# Patient Record
Sex: FEMALE | URBAN - METROPOLITAN AREA
[De-identification: names, ages, dates, MRNs, and addresses within clinical notes are randomized per-mention and may not be internally consistent; named-entity substitution may affect disease eponyms.]

---

## 2017-02-14 ENCOUNTER — OFFICE VISIT (OUTPATIENT)
Dept: PEDIATRICS | Facility: CLINIC | Age: 27
End: 2017-02-14
Payer: COMMERCIAL

## 2017-02-14 VITALS
SYSTOLIC BLOOD PRESSURE: 98 MMHG | BODY MASS INDEX: 29.5 KG/M2 | OXYGEN SATURATION: 99 % | HEART RATE: 88 BPM | WEIGHT: 166.5 LBS | DIASTOLIC BLOOD PRESSURE: 60 MMHG | TEMPERATURE: 97.8 F | HEIGHT: 63 IN

## 2017-02-14 DIAGNOSIS — R82.90 NONSPECIFIC FINDING ON EXAMINATION OF URINE: ICD-10-CM

## 2017-02-14 DIAGNOSIS — R10.13 ABDOMINAL PAIN, EPIGASTRIC: Primary | ICD-10-CM

## 2017-02-14 LAB
ALBUMIN UR-MCNC: 10 MG/DL
APPEARANCE UR: ABNORMAL
BACTERIA #/AREA URNS HPF: ABNORMAL /HPF
BETA HCG QUAL IFA URINE: NEGATIVE
BILIRUB UR QL STRIP: NEGATIVE
COLOR UR AUTO: YELLOW
GLUCOSE UR STRIP-MCNC: NEGATIVE MG/DL
HGB UR QL STRIP: NEGATIVE
KETONES UR STRIP-MCNC: NEGATIVE MG/DL
LEUKOCYTE ESTERASE UR QL STRIP: ABNORMAL
NITRATE UR QL: NEGATIVE
NON-SQ EPI CELLS #/AREA URNS LPF: ABNORMAL /LPF
PH UR STRIP: 7 PH (ref 5–7)
RBC #/AREA URNS AUTO: ABNORMAL /HPF (ref 0–2)
SP GR UR STRIP: 1.02 (ref 1–1.03)
URN SPEC COLLECT METH UR: ABNORMAL
UROBILINOGEN UR STRIP-MCNC: 2 MG/DL (ref 0–2)
WBC #/AREA URNS AUTO: ABNORMAL /HPF (ref 0–2)

## 2017-02-14 PROCEDURE — 81001 URINALYSIS AUTO W/SCOPE: CPT | Performed by: NURSE PRACTITIONER

## 2017-02-14 PROCEDURE — 84703 CHORIONIC GONADOTROPIN ASSAY: CPT | Performed by: NURSE PRACTITIONER

## 2017-02-14 PROCEDURE — 87086 URINE CULTURE/COLONY COUNT: CPT | Performed by: NURSE PRACTITIONER

## 2017-02-14 PROCEDURE — 99214 OFFICE O/P EST MOD 30 MIN: CPT | Performed by: NURSE PRACTITIONER

## 2017-02-14 RX ORDER — NICOTINE POLACRILEX 4 MG/1
20 GUM, CHEWING ORAL DAILY
Qty: 30 TABLET | Refills: 1 | Status: SHIPPED | OUTPATIENT
Start: 2017-02-14 | End: 2018-01-25

## 2017-02-14 NOTE — PROGRESS NOTES
Reina LOMAS,    Attached are your test results.  Pregnancy test negative    Please contact us if you have any questions.    Mey Buitrago, CNP

## 2017-02-14 NOTE — PROGRESS NOTES
SUBJECTIVE:                                                    Steffany LOMAS is a 26 year old female who presents to clinic today for the following health issues:    ABDOMINAL PAIN     Onset: 1 day    Description:   Character: Sharp, Dull ache and Burning  Location: epigastric region  Radiation: Pelvic region    Intensity: moderate    Progression of Symptoms:  worsening    Accompanying Signs & Symptoms:  Fever/Chills?: YES- chills and body aches  Gas/Bloating: YES  Nausea: no   Vomitting: no   Diarrhea?: no   Constipation:YES  Dysuria or Hematuria: no    History:   Trauma: no   Previous similar pain: no    Previous tests done: none    Precipitating factors:   Does the pain change with:     Food: YES- feels a little bit better after eating     BM: no     Urination: no     Alleviating factors:  none    Therapies Tried and outcome: Tums, tylenol     LMP:  1/18/17     Started yesterday morning  Felt like it was moving and was a burning sensation  Has tried some TUMS and did not really help much and some Tylenol   Last pregnancy and not thinking can be pregnant now     Problem list and histories reviewed & adjusted, as indicated.  Additional history: as documented    Patient Active Problem List   Diagnosis     CARDIOVASCULAR SCREENING; LDL GOAL LESS THAN 160     Past Surgical History   Procedure Laterality Date     C/section, low transverse  2011       Social History   Substance Use Topics     Smoking status: Never Smoker     Smokeless tobacco: Never Used     Alcohol use No     Family History   Problem Relation Age of Onset     Hypertension Maternal Grandmother      DIABETES Maternal Grandfather      Hypertension Paternal Grandmother      DIABETES Paternal Grandmother      Alcohol/Drug Paternal Grandmother      cirrhosis         No current outpatient prescriptions on file.     Allergies   Allergen Reactions     Nkda [No Known Drug Allergies]        ROS:  CONSTITUTIONAL:POSITIVE  for chills and NEGATIVE  for  "fever   ENT/MOUTH: NEGATIVE for ear, mouth and throat problems  RESP:NEGATIVE for significant cough or SOB  CV: NEGATIVE for chest pain, palpitations or peripheral edema  GI: POSITIVE for abdominal pain epigastric and NEGATIVE for nausea, poor appetite, vomiting and weight loss  MUSCULOSKELETAL: NEGATIVE for significant arthralgias or myalgia    OBJECTIVE:                                                    BP 98/60 (BP Location: Right arm, Patient Position: Chair, Cuff Size: Adult Regular)  Pulse 88  Temp 97.8  F (36.6  C) (Tympanic)  Ht 5' 2.5\" (1.588 m)  Wt 166 lb 8 oz (75.5 kg)  LMP 01/18/2017  SpO2 99%  BMI 29.97 kg/m2  Body mass index is 29.97 kg/(m^2).  GENERAL APPEARANCE: alert, active and no distress  RESP: lungs clear to auscultation - no rales, rhonchi or wheezes  CV: regular rates and rhythm and no murmur, click or rub  ABDOMEN: mild and moderate tenderness in the epigastric area, no rebound tenderness, no guarding or rigidity, no CVA tenderness, no herniae noted, no masses noted  MS: extremities normal- no gross deformities noted  SKIN: no suspicious lesions or rashes  PSYCH: mentation appears normal and affect normal/bright    Diagnostic Test Results:  Results for orders placed or performed in visit on 02/14/17   UA with Microscopic reflex to Culture ([place])   Result Value Ref Range    Color Urine Yellow     Appearance Urine Slightly Cloudy     Glucose Urine Negative NEG mg/dL    Bilirubin Urine Negative NEG    Ketones Urine Negative NEG mg/dL    Specific Gravity Urine 1.020 1.003 - 1.035    Blood Urine Negative NEG    pH Urine 7.0 5.0 - 7.0 pH    Protein Albumin Urine 10 (A) NEG mg/dL    Urobilinogen mg/dL 2.0 0.0 - 2.0 mg/dL    Nitrite Urine Negative NEG    Leukocyte Esterase Urine Moderate (A) NEG    Source Midstream Urine     WBC Urine 5-10 (A) 0 - 2 /HPF    RBC Urine O - 2 0 - 2 /HPF    Bacteria Urine Few (A) NEG /HPF    Squamous Epithelial /LPF Urine Few FEW /LPF   Beta HCG qual IFA urine "   Result Value Ref Range    Beta HCG Qual IFA Urine Negative NEG   Urine Culture Aerobic Bacterial   Result Value Ref Range    Specimen Description Midstream Urine     Culture Micro       50,000 to 100,000 colonies/mL mixed urogenital viridiana Susceptibility testing not   routinely done      Micro Report Status FINAL 02/15/2017         ASSESSMENT/PLAN:                                                      Steffany was seen today for abdominal pain.    Diagnoses and all orders for this visit:    Abdominal pain, epigastric  -     UA with Microscopic reflex to Culture ([place])  -     Beta HCG qual IFA urine  -     omeprazole 20 MG tablet; Take 1 tablet (20 mg) by mouth daily Take 30-60 minutes before a meal.    Nonspecific finding on examination of urine  -     Urine Culture Aerobic Bacterial    PLAN:   Symptomatic therapy suggested: increase fluids, bland diet and call prn if symptoms persist or worsen.  Will try the prilosec once a day  Patient needs to follow up in if no improvement,or sooner if worsening of symptoms or other symptoms develop.  If symptoms do not improve in the next week need to send me a message and I will order abdomen ultrasound     See Patient Instructions    BARBY [facility]

## 2017-02-14 NOTE — NURSING NOTE
"Chief Complaint   Patient presents with     Abdominal Pain     epigastric abdominal pain x 1 day, burning sensation, loss of appetite, body aches       Initial BP 98/60 (BP Location: Right arm, Patient Position: Chair, Cuff Size: Adult Regular)  Pulse 88  Temp 97.8  F (36.6  C) (Tympanic)  Ht 5' 2.5\" (1.588 m)  Wt 166 lb 8 oz (75.5 kg)  LMP 01/18/2017  SpO2 99%  BMI 29.97 kg/m2 Estimated body mass index is 29.97 kg/(m^2) as calculated from the following:    Height as of this encounter: 5' 2.5\" (1.588 m).    Weight as of this encounter: 166 lb 8 oz (75.5 kg).  Medication Reconciliation: complete      ISABEL Martin      "

## 2017-02-14 NOTE — PATIENT INSTRUCTIONS
It was a pleasure seeing you today at the [facility]. Thank you for allowing us to care for you today. We truly hope we provided you with the excellent service you deserve. Please let us know if there is anything else we can do for you so we can be sure you are leaving completley satisfied with your care experience.       General information about your clinic   Clinic Hours Lab Hours (Appointments are required)   Mon-Thurs: 7:30 AM - 7 PM Mon-Thurs: 7:30 AM - 7 PM   Fri: 7:30 AM - 5 PM Fri: 7:30 AM - 5 PM        After Hours Nurse Advise & Appts:  [facility]: [phone]  [facility] On Call: to make appointments anytime: [phone] On Call Physician: call [phone] and answering service will page the on call physician.        For urgent appointments, please call [phone] and ask for the triage nurse or your care team clinic nurse.  How to contact my care team:  [place]: [email or web address]   Phone: [phone]   Fax: [phone]       [facility]:   Phone: [phone]  Hours: 8:00 AM - 6:00 PM  Medication Refills:  Call your pharmacy and they will forward the refill to us. Please allow 3 business days for your refills to be completed.       Normal or non-critical lab and imaging results will be communicated to you by MyChart, letter or phone within 7 days.  If you do not hear from us within 10 days, please call the clinic. If you have a critical or abnormal lab result, we will notify you by phone as soon as possible.       We now have PWIC (Pediatric Walk in Care)  Monday-Friday from 7:30-4. Simply walk in and be seen for your urgent needs like cough, fever, rash, diarrhea or vomiting, pink eye, UTI. No appointments needed. Ask one of the team for more information    PLAN:   1.   Symptomatic therapy suggested: increase fluids, bland diet and call prn if symptoms persist or worsen.  Will try the prilosec once a day  2.  Orders Placed This Encounter    Medications     omeprazole 20 MG tablet     Sig: Take 1 tablet (20 mg) by mouth daily Take 30-60 minutes before a meal.     Dispense:  30 tablet     Refill:  1     Orders Placed This Encounter   Procedures     UA with Microscopic reflex to Culture ([place])     Beta HCG qual IFA urine       3. Patient needs to follow up in if no improvement,or sooner if worsening of symptoms or other symptoms develop.  If symptoms do not improve in the next week need to send me a message and I will order abdomen ultrasound         -Your Care Team:    Dr. Pratima Franco - Internal Medicine/Pediatrics   Dr. Jacob Castaenda - Family Medicine  Dr. Dena New - Pediatrics  Mey Buitrago CNP - Family Practice Nurse Practitioner  Dr. Rachel Jackson - Pediatrics  Dr. Pasquale Sykes - Internal Medicine              Epigastric Pain (Uncertain Cause)    Epigastric pain can be a sign of disease in the upper abdomen. Common causes include:    Acid reflux (stomach acid flowing up into the esophagus)    Gastritis (irritation of the stomach lining)    Peptic Ulcer Disease    Inflammation of the pancreas    Gallstone    Infection in the gallbladder  Pain may be dull or burning. It may spread upward to the chest or to the back. There may be other symptoms such as belching, bloating, cramps or hunger pains. There may be weight loss or poor appetite, nausea or vomiting.  Since the diagnosis of your pain is not certain yet, further tests may sometimes be needed. Sometimes the doctor will treat you for the most likely condition to see if there is improvement before doing further tests.  Home care  Medicines    Antacids help neutralize the normal acids in your stomach. Examples are Maalox, Mylanta, Rolaids, and Tums. If you don t like the liquid, you can also try a chewable one. You may find one works better than another for you. Overuse can cause diarrhea or constipation.    Acid blockers (H2 blockers) decrease acid production. Examples are cimetidine  (Tagamet), famotidine (Pepcid) and ranitidine (Zantac).    Acid inhibitors (PPIs) decrease acid production in a different way than the blockers. You may find they work better, but can take a little longer to take effect.  Examples are omeprazole (Prilosec), lansoprazole (Prevacid), pantoprazole (Protonix), rabeprazole (Aciphex), and esomeprazole (Nexium).    Take an antacid 30-60 minutes after eating and at bedtime, but not at the same time as an acid blocker.    Try not to take NSAIDs. Aspirin may also cause problems, but if taking it for your heart or other medical reasons, talk to your doctor before stopping it; you do not want to cause a worse problem, like a heart attack or stroke.  Diet    If certain foods seem to cause your spasm, try to avoid them.     Eat slowly and chew food well before swallowing. Symptoms of gastritis can be worsened by certain foods. Limit or avoid fatty, fried, and spicy foods, as well as coffee, chocolate, mint, and foods with high acid content such as tomatoes and citrus fruit and juices (orange, grapefruit, lemon).    Avoid alcohol, caffeine, and tobacco, which can delay healing and worsen your problem.    Try eating smaller meals with snacks in between  Follow-up care  Follow up with your healthcare provider or as advised.  When to seek medical advice  Call your healthcare provider right away if any of the following occur:    Stomach pain worsens or moves to the right lower part of the abdomen    Chest pain appears, or if it worsens or spreads to the chest, back, neck, shoulder, or arm    Frequent vomiting (can t keep down liquids)    Blood in the stool or vomit (red or black color)    Feeling weak or dizzy, fainting, or having trouble breathing    Fever of 100.4 F (38 C) or higher, or as directed by your healthcare provider    Abdominal swelling                4462-9138 [employer]. [address] [place] [address], [place] [address]. All rights reserved. This information is  not intended as a substitute for professional medical care. Always follow your healthcare professional's instructions.

## 2017-02-14 NOTE — MR AVS SNAPSHOT
After Visit Summary   2/14/2017    Steffany LOMAS    MRN: [id number]           Patient Information     Date Of Birth          1990        Visit Information        Provider Department      2/14/2017 10:00 AM Mey Buitrago APRN CNP [facility]        Today's Diagnoses     Abdominal pain, epigastric    -  1    Nonspecific finding on examination of urine          Care Instructions    It was a pleasure seeing you today at the [facility]. Thank you for allowing us to care for you today. We truly hope we provided you with the excellent service you deserve. Please let us know if there is anything else we can do for you so we can be sure you are leaving completley satisfied with your care experience.       General information about your clinic   Clinic Hours Lab Hours (Appointments are required)   Mon-Thurs: 7:30 AM - 7 PM Mon-Thurs: 7:30 AM - 7 PM   Fri: 7:30 AM - 5 PM Fri: 7:30 AM - 5 PM        After Hours Nurse Advise & Appts:  [place] Nurse Advisors: [phone]  [facility] On Call: to make appointments anytime: [phone] On Call Physician: call [phone] and answering service will page the on call physician.        For urgent appointments, please call [phone] and ask for the triage nurse or your care team clinic nurse.  How to contact my care team:  MyChart: [email or web address]   Phone: [phone]   Fax: [phone]       [facility]:   Phone: [phone]  Hours: 8:00 AM - 6:00 PM  Medication Refills:  Call your pharmacy and they will forward the refill to us. Please allow 3 business days for your refills to be completed.       Normal or non-critical lab and imaging results will be communicated to you by MyChart, letter or phone within 7 days.  If you do not hear from us within 10 days, please call the clinic. If you have a critical or abnormal lab result, we will notify you by phone as soon as possible.        We now have PWIC (Pediatric Walk in Care)  Monday-Friday from 7:30-4. Simply walk in and be seen for your urgent needs like cough, fever, rash, diarrhea or vomiting, pink eye, UTI. No appointments needed. Ask one of the team for more information    PLAN:   1.   Symptomatic therapy suggested: increase fluids, bland diet and call prn if symptoms persist or worsen.  Will try the prilosec once a day  2.  Orders Placed This Encounter   Medications     omeprazole 20 MG tablet     Sig: Take 1 tablet (20 mg) by mouth daily Take 30-60 minutes before a meal.     Dispense:  30 tablet     Refill:  1     Orders Placed This Encounter   Procedures     UA with Microscopic reflex to Culture ([place])     Beta HCG qual IFA urine       3. Patient needs to follow up in if no improvement,or sooner if worsening of symptoms or other symptoms develop.  If symptoms do not improve in the next week need to send me a message and I will order abdomen ultrasound         -Your Care Team:    Dr. Pratima Franco - Internal Medicine/Pediatrics   Dr. Jacob Castaneda - Family Medicine  Dr. Dena New - Pediatrics  Mey Buitrago CNP - Family Practice Nurse Practitioner  Dr. Rachel Jackson - Pediatrics  Dr. Pasquale Sykes - Internal Medicine              Epigastric Pain (Uncertain Cause)    Epigastric pain can be a sign of disease in the upper abdomen. Common causes include:    Acid reflux (stomach acid flowing up into the esophagus)    Gastritis (irritation of the stomach lining)    Peptic Ulcer Disease    Inflammation of the pancreas    Gallstone    Infection in the gallbladder  Pain may be dull or burning. It may spread upward to the chest or to the back. There may be other symptoms such as belching, bloating, cramps or hunger pains. There may be weight loss or poor appetite, nausea or vomiting.  Since the diagnosis of your pain is not certain yet, further tests may sometimes be needed. Sometimes the doctor will treat you for the most likely  condition to see if there is improvement before doing further tests.  Home care  Medicines    Antacids help neutralize the normal acids in your stomach. Examples are Maalox, Mylanta, Rolaids, and Tums. If you don t like the liquid, you can also try a chewable one. You may find one works better than another for you. Overuse can cause diarrhea or constipation.    Acid blockers (H2 blockers) decrease acid production. Examples are cimetidine (Tagamet), famotidine (Pepcid) and ranitidine (Zantac).    Acid inhibitors (PPIs) decrease acid production in a different way than the blockers. You may find they work better, but can take a little longer to take effect.  Examples are omeprazole (Prilosec), lansoprazole (Prevacid), pantoprazole (Protonix), rabeprazole (Aciphex), and esomeprazole (Nexium).    Take an antacid 30-60 minutes after eating and at bedtime, but not at the same time as an acid blocker.    Try not to take NSAIDs. Aspirin may also cause problems, but if taking it for your heart or other medical reasons, talk to your doctor before stopping it; you do not want to cause a worse problem, like a heart attack or stroke.  Diet    If certain foods seem to cause your spasm, try to avoid them.     Eat slowly and chew food well before swallowing. Symptoms of gastritis can be worsened by certain foods. Limit or avoid fatty, fried, and spicy foods, as well as coffee, chocolate, mint, and foods with high acid content such as tomatoes and citrus fruit and juices (orange, grapefruit, lemon).    Avoid alcohol, caffeine, and tobacco, which can delay healing and worsen your problem.    Try eating smaller meals with snacks in between  Follow-up care  Follow up with your healthcare provider or as advised.  When to seek medical advice  Call your healthcare provider right away if any of the following occur:    Stomach pain worsens or moves to the right lower part of the abdomen    Chest pain appears, or if it worsens or spreads to  the chest, back, neck, shoulder, or arm    Frequent vomiting (can t keep down liquids)    Blood in the stool or vomit (red or black color)    Feeling weak or dizzy, fainting, or having trouble breathing    Fever of 100.4 F (38 C) or higher, or as directed by your healthcare provider    Abdominal swelling                2040-9116 [employer]. [address] [place] [address]. All rights reserved. This information is not intended as a substitute for professional medical care. Always follow your healthcare professional's instructions.              Follow-ups after your visit        Who to contact     If you have questions or need follow up information about today's clinic visit or your schedule please contact [facility] directly at [phone].  Normal or non-critical lab and imaging results will be communicated to you by [facility] [employer], letter or phone within 4 business days after the clinic has received the results. If you do not hear from us within 7 days, please contact the clinic through [employer] or phone. If you have a critical or abnormal lab result, we will notify you by phone as soon as possible.  Submit refill requests through [facility] or call your pharmacy and they will forward the refill request to us. Please allow 3 business days for your refill to be completed.          Additional Information About Your Visit        [employer] Information     [employer] gives you secure access to your electronic health record. If you see a primary care provider, you can also send messages to your care team and make appointments. If you have questions, please call your primary care clinic.  If you do not have a primary care provider, please call [phone] and they will assist you.      [employer] is an electronic gateway that provides easy, online access to your medical records. With [employer], you can request a clinic appointment, read your test results, renew a prescription or communicate with  "your care team.     To access your existing account, please contact your [facility] or call [phone] for assistance.        Care EveryWhere ID     This is your Care EveryWhere ID. This could be used by other organizations to access your [facility] medical records  [id number]        Your Vitals Were     Pulse Temperature Height Last Period Pulse Oximetry BMI (Body Mass Index)    88 97.8  F (36.6  C) (Tympanic) 5' 2.5\" (1.588 m) 01/18/2017 99% 29.97 kg/m2       Blood Pressure from Last 3 Encounters:   02/14/17 98/60   11/08/16 103/63   12/16/15 105/46    Weight from Last 3 Encounters:   02/14/17 166 lb 8 oz (75.5 kg)   11/08/16 169 lb 4 oz (76.8 kg)   12/16/15 166 lb (75.3 kg)              We Performed the Following     Beta HCG qual IFA urine     UA with Microscopic reflex to Culture ([place])     Urine Culture Aerobic Bacterial          Today's Medication Changes          These changes are accurate as of: 2/14/17 10:49 AM.  If you have any questions, ask your nurse or doctor.               Start taking these medicines.        Dose/Directions    omeprazole 20 MG tablet   Used for:  Abdominal pain, epigastric   Started by:  Mey Buitrago APRN CNP        Dose:  20 mg   Take 1 tablet (20 mg) by mouth daily Take 30-60 minutes before a meal.   Quantity:  30 tablet   Refills:  1            Where to get your medicines      These medications were sent to [facility] - [place], [place] - [address], [address]  [address], [address], [facility] [address]     Phone:  [phone]     omeprazole 20 MG tablet                Primary Care Provider Office Phone # Fax #    Hallie Abebe [phone]       [facility] [address]  [facility] [address]        Thank you!     Thank you for choosing [facility]  for your care. Our goal is always to provide you with excellent care. " Hearing back from our patients is one way we can continue to improve our services. Please take a few minutes to complete the written survey that you may receive in the mail after your visit with us. Thank you!             Your Updated Medication List - Protect others around you: Learn how to safely use, store and throw away your medicines at www.disposemymeds.org.          This list is accurate as of: 2/14/17 10:49 AM.  Always use your most recent med list.                   Brand Name Dispense Instructions for use    omeprazole 20 MG tablet     30 tablet    Take 1 tablet (20 mg) by mouth daily Take 30-60 minutes before a meal.

## 2017-02-15 LAB
BACTERIA SPEC CULT: NORMAL
MICRO REPORT STATUS: NORMAL
SPECIMEN SOURCE: NORMAL

## 2017-02-15 NOTE — PROGRESS NOTES
Reina LOMAS,    Attached are your test results.  -Urine culture is abnormal but it looks like a contaminated, non clean-catch sample.  There is no need for antibiotics at this point.  If new, worsening or persistent symptoms, then you should call or return for a recheck.     Please contact us if you have any questions.    Mey Buitrago, CNP

## 2017-05-01 ENCOUNTER — OFFICE VISIT (OUTPATIENT)
Dept: URGENT CARE | Facility: RETAIL CLINIC | Age: 27
End: 2017-05-01
Payer: COMMERCIAL

## 2017-05-01 VITALS — DIASTOLIC BLOOD PRESSURE: 57 MMHG | SYSTOLIC BLOOD PRESSURE: 92 MMHG | HEART RATE: 96 BPM | TEMPERATURE: 99.9 F

## 2017-05-01 DIAGNOSIS — J02.0 STREP THROAT: Primary | ICD-10-CM

## 2017-05-01 DIAGNOSIS — J02.9 ACUTE PHARYNGITIS, UNSPECIFIED ETIOLOGY: ICD-10-CM

## 2017-05-01 LAB — S PYO AG THROAT QL IA.RAPID: POSITIVE

## 2017-05-01 PROCEDURE — 99203 OFFICE O/P NEW LOW 30 MIN: CPT | Performed by: PHYSICIAN ASSISTANT

## 2017-05-01 PROCEDURE — 87880 STREP A ASSAY W/OPTIC: CPT | Mod: QW | Performed by: PHYSICIAN ASSISTANT

## 2017-05-01 RX ORDER — IBUPROFEN 800 MG/1
TABLET, FILM COATED ORAL
Qty: 15 TABLET | Refills: 0 | Status: SHIPPED | OUTPATIENT
Start: 2017-05-01

## 2017-05-01 RX ORDER — PENICILLIN V POTASSIUM 500 MG/1
500 TABLET, FILM COATED ORAL 2 TIMES DAILY
Qty: 20 TABLET | Refills: 0 | Status: SHIPPED | OUTPATIENT
Start: 2017-05-01 | End: 2017-05-11

## 2017-05-01 NOTE — PATIENT INSTRUCTIONS
Take antibiotic as directed and finish entire course.  Change toothbrush after at least 24 hours of starting antibiotics.   Will be contagious for 24 hours after starting antibiotic  May return to work/activities 24 hours after antibiotics are started  Symptomatic treat with fluids, rest, acetaminophen or ibuprofen as needed.   Please follow up with primary care provider if not improving, worsening or new symptoms or for any adverse reactions to medications.

## 2017-05-01 NOTE — MR AVS SNAPSHOT
After Visit Summary   5/1/2017    Steffany LOMAS    MRN: [id number]           Patient Information     Date Of Birth          1990        Visit Information        Provider Department      5/1/2017 4:00 PM Katina Mccormack PA-C [facility]        Today's Diagnoses     Strep throat    -  1    Acute pharyngitis, unspecified etiology          Care Instructions    Take antibiotic as directed and finish entire course.  Change toothbrush after at least 24 hours of starting antibiotics.   Will be contagious for 24 hours after starting antibiotic  May return to work/activities 24 hours after antibiotics are started  Symptomatic treat with fluids, rest, acetaminophen or ibuprofen as needed.   Please follow up with primary care provider if not improving, worsening or new symptoms or for any adverse reactions to medications.          Follow-ups after your visit        Your next 10 appointments already scheduled     May 01, 2017  5:00 PM CDT   SHORT with Lise Danielson PA-C   [facility] ([facility]    [address] [place] [address], [address]  [facility] [address] [phone]              Who to contact     You can reach your care team any time of the day by calling [phone].  Notification of test results:  If you have an abnormal lab result, we will notify you by phone as soon as possible.         Additional Information About Your Visit        MyChart Information     PingSomehart gives you secure access to your electronic health record. If you see a primary care provider, you can also send messages to your care team and make appointments. If you have questions, please call your primary care clinic.  If you do not have a primary care provider, please call [phone] and they will assist you.        Care EveryWhere ID     This is your Care EveryWhere ID. This could be used by other organizations to access your [facility] medical records  [id number]         Your Vitals Were     Pulse Temperature                96 99.9  F (37.7  C) (Oral)           Blood Pressure from Last 3 Encounters:   05/01/17 92/57   02/14/17 98/60   11/08/16 103/63    Weight from Last 3 Encounters:   02/14/17 166 lb 8 oz (75.5 kg)   11/08/16 169 lb 4 oz (76.8 kg)   12/16/15 166 lb (75.3 kg)              We Performed the Following     RAPID STREP SCREEN          Today's Medication Changes          These changes are accurate as of: 5/1/17  4:29 PM.  If you have any questions, ask your nurse or doctor.               Start taking these medicines.        Dose/Directions    ibuprofen 800 MG tablet   Commonly known as:  ADVIL/MOTRIN   Used for:  Strep throat   Started by:  Katina Mccormack PA-C        Take 0.5 tablet every 4 hour as needed for pain   Quantity:  15 tablet   Refills:  0       penicillin V potassium 500 MG tablet   Commonly known as:  VEETID   Used for:  Strep throat   Started by:  Katina Mccormack PA-C        Dose:  500 mg   Take 1 tablet (500 mg) by mouth 2 times daily for 10 days   Quantity:  20 tablet   Refills:  0            Where to get your medicines      These medications were sent to [id number] - [place], [place] - [address]  [address] [facility] [address]     Phone:  [phone]     ibuprofen 800 MG tablet    penicillin V potassium 500 MG tablet                Primary Care Provider Office Phone # Fax #    Hallie Nai Abebe [phone]       [facility] [address]  [facility] [address]        Thank you!     Thank you for choosing [facility]  for your care. Our goal is always to provide you with excellent care. Hearing back from our patients is one way we can continue to improve our services. Please take a few minutes to complete the written survey that you may receive in the mail after your visit with us. Thank you!             Your Updated Medication List - Protect  others around you: Learn how to safely use, store and throw away your medicines at www.disposemymeds.org.          This list is accurate as of: 5/1/17  4:29 PM.  Always use your most recent med list.                   Brand Name Dispense Instructions for use    ibuprofen 800 MG tablet    ADVIL/MOTRIN    15 tablet    Take 0.5 tablet every 4 hour as needed for pain       omeprazole 20 MG tablet     30 tablet    Take 1 tablet (20 mg) by mouth daily Take 30-60 minutes before a meal.       penicillin V potassium 500 MG tablet    VEETID    20 tablet    Take 1 tablet (500 mg) by mouth 2 times daily for 10 days       TYLENOL PO

## 2017-05-01 NOTE — PROGRESS NOTES
Chief Complaint   Patient presents with     Throat Pain     began last night; worse today     Generalized Body Aches     began last night     Headache     on and off     Chills        SUBJECTIVE:  Steffany LOMAS is a 26 year old female with a chief complaint of sore throat.  Onset of symptoms was 1 day(s) ago.    Course of illness: gradual onset, worse today.  Severity moderate  Current and Associated symptoms: sore throat, body aches, on and off headache, chills, hard to swallow  Treatment measures tried include Fluids.  Predisposing factors include None.    Past Medical History:   Diagnosis Date     NO ACTIVE PROBLEMS      Current Outpatient Prescriptions   Medication Sig Dispense Refill     omeprazole 20 MG tablet Take 1 tablet (20 mg) by mouth daily Take 30-60 minutes before a meal. 30 tablet 1        Allergies   Allergen Reactions     Nkda [No Known Drug Allergies]         History   Smoking Status     Never Smoker   Smokeless Tobacco     Never Used       ROS:  Review of systems negative except as stated above.    OBJECTIVE:   BP 92/57 (BP Location: Left arm)  Pulse 96  Temp 99.9  F (37.7  C) (Oral)  GENERAL APPEARANCE:mildly ill appearing  EYES: conjunctiva clear  HENT: ear canals and TM's normal.  Nose normal.  Pharynx erythematous with some exudate noted.  NECK: supple, tender to palpation, moderate anterior  cervical adenopathy noted  CV: regular rates and rhythm, normal S1 S2, no murmur noted  SKIN: no suspicious lesions or rashes    Rapid Strep test is positive    ASSESSMENT:     Strep throat  Acute pharyngitis, unspecified etiology    PLAN:   penicillin V potassium (VEETID) 500 MG tablet,   ibuprofen (ADVIL/MOTRIN) 800 MG tablet  Take antibiotic as directed and finish entire course.  Change toothbrush after at least 24 hours of starting antibiotics.   Will be contagious for 24 hours after starting antibiotic  May return to work/activities 24 hours after antibiotics are started  Symptomatic treat  with fluids, rest, acetaminophen or ibuprofen as needed.   Please follow up with primary care provider if not improving, worsening or new symptoms or for any adverse reactions to medications.      CONRAD Farley [facility]

## 2017-05-30 ENCOUNTER — OFFICE VISIT (OUTPATIENT)
Dept: OTOLARYNGOLOGY | Facility: CLINIC | Age: 27
End: 2017-05-30
Payer: COMMERCIAL

## 2017-05-30 VITALS — BODY MASS INDEX: 28.35 KG/M2 | WEIGHT: 160 LBS | HEIGHT: 63 IN | RESPIRATION RATE: 12 BRPM

## 2017-05-30 DIAGNOSIS — J35.01 CHRONIC TONSILLITIS: Primary | ICD-10-CM

## 2017-05-30 PROCEDURE — 99214 OFFICE O/P EST MOD 30 MIN: CPT | Performed by: OTOLARYNGOLOGY

## 2017-05-30 NOTE — PATIENT INSTRUCTIONS
Scheduling Information  To schedule your CT/MRI scan, please contact [facility] at [phone] OR [facility] at [phone]    To schedule your Surgery, please contact our Specialty Schedulers at [phone]      ENT Clinic Locations Clinic Hours Telephone Number     [facility]  [address] [place] [address]   Monday:           1:00pm -- 5:00pm    Friday:              8:00am - 12:00pm   To schedule/reschedule an appointment with   Dr. Glez,   please contact our   Specialty Scheduling Department at:     [phone]       [facility]  [address] [place] [address] Tuesday:          8:00am -- 2:00pm         Urgent Care Locations Clinic Hours Telephone Numbers     [facility]  [address] [place] [address]     Monday-Friday:     11:00am - 9:00pm    Saturday-Sunday:  9:00am - 5:00pm   [phone]     [facility]  [address] [place] [address]     Monday-Friday:      5:00pm - 9:00pm     Saturday-Sunday:  9:00am - 5:00pm   [phone]

## 2017-05-30 NOTE — PROGRESS NOTES
History of Present Illness - Steffany LOMAS is a 26 year old female here in follow up [place] [facility] [place].  She had LEFT sided PTA that early and not able to be drained.  After IV therapy she improved, and was discharged the next day on steroid taper and clinda.  She is doing much better, and her swallowing is fine.  She did have a few days of pain in the LEFT throat since then, and it is symptom free at this point.     Past Medical History -   Patient Active Problem List   Diagnosis     CARDIOVASCULAR SCREENING; LDL GOAL LESS THAN 160       Current Medications -   Current Outpatient Prescriptions:      Acetaminophen (TYLENOL PO), , Disp: , Rfl:      ibuprofen (ADVIL/MOTRIN) 800 MG tablet, Take 0.5 tablet every 4 hour as needed for pain, Disp: 15 tablet, Rfl: 0     omeprazole 20 MG tablet, Take 1 tablet (20 mg) by mouth daily Take 30-60 minutes before a meal. (Patient not taking: Reported on 5/1/2017), Disp: 30 tablet, Rfl: 1    Allergies -   Allergies   Allergen Reactions     Nkda [No Known Drug Allergies]        Social History -   Social History     Social History     Marital status: Significant other     Spouse name: partner/christopher Robertson     Number of children: 2     Years of education: N/A     Occupational History     retail management      [employer]     Social History Main Topics     Smoking status: Never Smoker     Smokeless tobacco: Never Used     Alcohol use No     Drug use: No     Sexual activity: Yes     Partners: Male     Other Topics Concern     Not on file     Social History Narrative    [place]    [place]       Family History -   Family History   Problem Relation Age of Onset     Hypertension Maternal Grandmother      DIABETES Maternal Grandfather      Hypertension Paternal Grandmother      DIABETES Paternal Grandmother      Alcohol/Drug Paternal Grandmother      cirrhosis       Review of Systems - As per HPI and PMHx, otherwise 10+ system review of the head and neck, and  "general constitution is negative.    Physical Exam  Resp 12  Ht 1.588 m (5' 2.5\")  Wt 72.6 kg (160 lb)  BMI 28.8 kg/m2    General - The patient is well nourished and well developed, and appears to have good nutritional status.  Alert and oriented to person and place, answers questions and cooperates with examination appropriately.   Head and Face - Normocephalic and atraumatic, with no gross asymmetry noted of the contour of the facial features.  The facial nerve is intact, with strong symmetric movements.  Voice and Breathing - The patient was breathing comfortably without the use of accessory muscles. There was no wheezing, stridor, or stertor.  The patients voice was clear and strong, and had appropriate pitch and quality.  Ears - The tympanic membranes are normal in appearance, bony landmarks are intact.  No retraction, perforation, or masses.  Normal mobility on valsalva maneuver, with no reports of dizziness on insuflation.  No fluid or purulence was seen in the external canal or the middle ear. No evidence of infection of the middle ear or external canal, cerumen was normal in appearance.  Eyes - Extraocular movements intact, and the pupils were reactive to light.  Sclera were not icteric or injected, conjunctiva were pink and moist.  Mouth - Examination of the oral cavity showed pink, healthy oral mucosa. No lesions or ulcerations noted.  The tongue was mobile and midline, and the dentition were in good condition.    Throat - The walls of the oropharynx were smooth, pink, moist, symmetric, and had no lesions or ulcerations.  The tonsillar pillars and soft palate were symmetric.  The uvula was midline on elevation.    Neck - Normal midline excursion of the laryngotracheal complex during swallowing.  Full range of motion on passive movement.  Palpation of the occipital, submental, submandibular, internal jugular chain, and supraclavicular nodes did not demonstrate any abnormal lymph nodes or masses.  The " carotid pulse was palpable bilaterally.  Palpation of the thyroid was soft and smooth, with no nodules or goiter appreciated.  The trachea was mobile and midline.        A/P - Steffany LOMAS is a 26 year old female  (J35.01) Chronic tonsillitis  (primary encounter diagnosis)  Comment:   Based on the physical exam and history, my recommendation is for tonsillectomy (with or without adenoidectomy).  The remainder of the visit was spent discussing the sings of recurrent abscess.  I have counseled her that this can happen again.  If not, then follow up as needed.

## 2017-05-30 NOTE — MR AVS SNAPSHOT
After Visit Summary   5/30/2017    Steffany LOMAS    MRN: [id number]           Patient Information     Date Of Birth          1990        Visit Information        Provider Department      5/30/2017 12:45 PM Jens Glez MD [facility]        Today's Diagnoses     Chronic tonsillitis    -  1      Care Instructions    Scheduling Information  To schedule your CT/MRI scan, please contact [facility] at [phone] OR [facility] at [phone]    To schedule your Surgery, please contact our Specialty Schedulers at [phone]      ENT Clinic Locations Clinic Hours Telephone Number     [facility]  [address]  [place] [address]   Monday:           1:00pm -- 5:00pm    Friday:              8:00am - 12:00pm   To schedule/reschedule an appointment with   Dr. Glez,   please contact our   Specialty Scheduling Department at:     [phone]       [facility]  [address] [place] [address] Tuesday:          8:00am -- 2:00pm         Urgent Care Locations Clinic Hours Telephone Numbers     [facility]  [address] [place] [address]     Monday-Friday:     11:00am - 9:00pm    Saturday-Sunday:  9:00am - 5:00pm   [phone]     [facility]  [address] [place] [address]     Monday-Friday:      5:00pm - 9:00pm     Saturday-Sunday:  9:00am - 5:00pm   [phone]                 Follow-ups after your visit        Who to contact     If you have questions or need follow up information about today's clinic visit or your schedule please contact [facility] directly at [phone].  Normal or non-critical lab and imaging results will be communicated to you by MyChart, letter or phone within 4 business days after the clinic has received the results. If you do not hear from us within 7 days, please contact the clinic through MyChart or phone. If you have a critical or  "abnormal lab result, we will notify you by phone as soon as possible.  Submit refill requests through HipLogic or call your pharmacy and they will forward the refill request to us. Please allow 3 business days for your refill to be completed.          Additional Information About Your Visit        CyOpticshart Information     HipLogic gives you secure access to your electronic health record. If you see a primary care provider, you can also send messages to your care team and make appointments. If you have questions, please call your primary care clinic.  If you do not have a primary care provider, please call [phone] and they will assist you.        Care EveryWhere ID     This is your Care EveryWhere ID. This could be used by other organizations to access your [facility] medical records  [id number]        Your Vitals Were     Respirations Height BMI (Body Mass Index)             12 1.588 m (5' 2.5\") 28.8 kg/m2          Blood Pressure from Last 3 Encounters:   05/01/17 92/57   02/14/17 98/60   11/08/16 103/63    Weight from Last 3 Encounters:   05/30/17 72.6 kg (160 lb)   02/14/17 75.5 kg (166 lb 8 oz)   11/08/16 76.8 kg (169 lb 4 oz)              Today, you had the following     No orders found for display       Primary Care Provider Office Phone # Fax #    Hallie Nai Abebe [phone]       [facility] [address] [facility] [address]        Thank you!     Thank you for choosing [facility]  for your care. Our goal is always to provide you with excellent care. Hearing back from our patients is one way we can continue to improve our services. Please take a few minutes to complete the written survey that you may receive in the mail after your visit with us. Thank you!             Your Updated Medication List - Protect others around you: Learn how to safely use, store and throw away your medicines at www.disposemymeds.org.          This list is " accurate as of: 5/30/17  1:31 PM.  Always use your most recent med list.                   Brand Name Dispense Instructions for use    ibuprofen 800 MG tablet    ADVIL/MOTRIN    15 tablet    Take 0.5 tablet every 4 hour as needed for pain       omeprazole 20 MG tablet     30 tablet    Take 1 tablet (20 mg) by mouth daily Take 30-60 minutes before a meal.       TYLENOL PO

## 2017-05-30 NOTE — NURSING NOTE
"Chief Complaint   Patient presents with     Consult     tonsils       Initial Resp 12  Ht 1.588 m (5' 2.5\")  Wt 72.6 kg (160 lb)  BMI 28.8 kg/m2 Estimated body mass index is 28.8 kg/(m^2) as calculated from the following:    Height as of this encounter: 1.588 m (5' 2.5\").    Weight as of this encounter: 72.6 kg (160 lb).  Medication Reconciliation: complete     Edson Mariano CMA      "

## 2017-10-11 ENCOUNTER — TELEPHONE (OUTPATIENT)
Dept: FAMILY MEDICINE | Facility: OTHER | Age: 27
End: 2017-10-11

## 2017-10-11 NOTE — TELEPHONE ENCOUNTER
Steffany LOMAS is a 27 year old female who presents with back pain after being 17 minutes late for 11AM appointment with FROYLAN. JM advised patient be rescheduled.    NURSING ASSESSMENT:  Description:  Having upper to middle back pain for about 1 month. Pain is intermitted for 2-3 days then resolves, and returns about 2 time per week. Has tried tylenol, sleeping in different positions, stretching and positional changes while standing. 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 Pain worsens through the day and interrupts sleep at this time, at the end of the day the entire back feels sore. Denies weakness, numbness, tingling, rapid increase of pain, fevers, dysuria, bloos in urine, shortness of breath, chest pain, radiating pain, known injury.  Onset/duration:  1 month intermitted for 2-3 days then resolves, and returns about 2 time per week  Pain scale (0-10)   0-8/10 varies on days that it hurts  Last exam/Treatment:  11/08/2016  Allergies:   Allergies   Allergen Reactions     Nkda [No Known Drug Allergies]      NURSING PLAN: Nursing advice to patient to follow up in clinic, suggested could go to , unable to work in today as she missed her OV    RECOMMENDED DISPOSITION:  See in 24 hours - for back pain worse at night  Will comply with recommendation: No- Barriers to comply with plan of care stated due to work will need to be seen after 630pm, discussed clinic hours and patient scheduled an apointment one week out and stated she would be able to get off early to be here by 625pm..  If further questions/concerns or if symptoms do not improve, worsen or new symptoms develop, call your PCP or [facility] as soon as possible.    NOTES:  Disposition was determined by the first positive assessment question, therefore all previous assessment questions were negative    Guideline used:  Telephone Triage Protocols for Nurses, Fifth Edition, Lissette Rock  Back Pain  Nursing  Judgment    Shea Elkins RN, BSN

## 2018-01-25 ENCOUNTER — OFFICE VISIT (OUTPATIENT)
Dept: FAMILY MEDICINE | Facility: CLINIC | Age: 28
End: 2018-01-25
Payer: COMMERCIAL

## 2018-01-25 VITALS
HEIGHT: 63 IN | HEART RATE: 95 BPM | BODY MASS INDEX: 30.3 KG/M2 | DIASTOLIC BLOOD PRESSURE: 63 MMHG | OXYGEN SATURATION: 99 % | WEIGHT: 171 LBS | SYSTOLIC BLOOD PRESSURE: 99 MMHG | TEMPERATURE: 99.3 F

## 2018-01-25 DIAGNOSIS — R10.13 EPIGASTRIC PAIN: Primary | ICD-10-CM

## 2018-01-25 DIAGNOSIS — K21.9 GASTROESOPHAGEAL REFLUX DISEASE WITHOUT ESOPHAGITIS: ICD-10-CM

## 2018-01-25 DIAGNOSIS — Z23 NEED FOR PROPHYLACTIC VACCINATION AND INOCULATION AGAINST INFLUENZA: ICD-10-CM

## 2018-01-25 PROBLEM — E66.811 OBESITY, CLASS I, BMI 30-34.9: Status: ACTIVE | Noted: 2018-01-25

## 2018-01-25 LAB
ERYTHROCYTE [DISTWIDTH] IN BLOOD BY AUTOMATED COUNT: 13.5 % (ref 10–15)
HCT VFR BLD AUTO: 38.7 % (ref 35–47)
HGB BLD-MCNC: 12.9 G/DL (ref 11.7–15.7)
MCH RBC QN AUTO: 29.5 PG (ref 26.5–33)
MCHC RBC AUTO-ENTMCNC: 33.3 G/DL (ref 31.5–36.5)
MCV RBC AUTO: 88 FL (ref 78–100)
PLATELET # BLD AUTO: 263 10E9/L (ref 150–450)
RBC # BLD AUTO: 4.38 10E12/L (ref 3.8–5.2)
WBC # BLD AUTO: 5.7 10E9/L (ref 4–11)

## 2018-01-25 PROCEDURE — 82150 ASSAY OF AMYLASE: CPT | Performed by: FAMILY MEDICINE

## 2018-01-25 PROCEDURE — 90686 IIV4 VACC NO PRSV 0.5 ML IM: CPT | Performed by: FAMILY MEDICINE

## 2018-01-25 PROCEDURE — 85027 COMPLETE CBC AUTOMATED: CPT | Performed by: FAMILY MEDICINE

## 2018-01-25 PROCEDURE — 36415 COLL VENOUS BLD VENIPUNCTURE: CPT | Performed by: FAMILY MEDICINE

## 2018-01-25 PROCEDURE — 90471 IMMUNIZATION ADMIN: CPT | Performed by: FAMILY MEDICINE

## 2018-01-25 PROCEDURE — 80076 HEPATIC FUNCTION PANEL: CPT | Performed by: FAMILY MEDICINE

## 2018-01-25 PROCEDURE — 99214 OFFICE O/P EST MOD 30 MIN: CPT | Mod: 25 | Performed by: FAMILY MEDICINE

## 2018-01-25 ASSESSMENT — PAIN SCALES - GENERAL: PAINLEVEL: MILD PAIN (2)

## 2018-01-25 NOTE — PROGRESS NOTES
"  SUBJECTIVE:   Steffany LOMAS is a 27 year old female who presents to clinic today for the following health issues:  She is accompanied by her .     GERD/Heartburn  Onset: ongoing for around a month, getting worse this week    Description:     Burning in chest: no    Intensity: moderate    Progression of Symptoms: same, intermittent    Accompanying Signs & Symptoms:  Does it feel like food gets stuck: no  Nausea: YES  Vomiting (bloody?): YES 1/24/18, no blood  Abdominal Pain: YES  Black-Tarry stools: no:  Bloody stools: no  Diarrhea: YES  Body chills: YES    History:   Previous ulcers: no  - currently menstruating, as expected for her cycle    Precipitating factors:   Caffeine use: YES  Alcohol use: YES  NSAID/Aspirin use: YES- ibuprofen  Tobacco use: no  - Worse with no particular food or drink.    Alleviating factors:      Therapies Tried and outcome: tums, no relief; ibu, no relief    Past medical, family, and social histories, medications, and allergies are reviewed and updated in Epic.     ROS:  C: NEGATIVE for fever, chills, change in weight  E/M: NEGATIVE for ear, mouth and throat problems  R: NEGATIVE for significant cough or SOB  CV: NEGATIVE for chest pain, palpitations or peripheral edema  ROS otherwise negative    This document serves as a record of the services and decisions personally performed and made by Dr. Forde. It was created on his behalf by Petty Marroquin, a trained medical scribe. The creation of this document is based the provider's statements to the medical scribe.  Petty Marroquin January 25, 2018 5:14 PM     OBJECTIVE:                                                    BP 99/63 (BP Location: Left arm, Patient Position: Chair, Cuff Size: Adult Regular)  Pulse 95  Temp 99.3  F (37.4  C) (Oral)  Ht 1.588 m (5' 2.5\")  Wt 77.6 kg (171 lb)  SpO2 99%  BMI 30.78 kg/m2   Body mass index is 30.78 kg/(m^2).     GENERAL: healthy, alert and no distress  EYES: Eyes grossly " normal to inspection, PERRL, EOMI, sclerae white and conjunctivae normal  HENT: nose and mouth without ulcers or lesions  RESP: lungs clear to auscultation - no crackles or wheezes, no areas of dullness, no tachypnea  CV: Heart regular rate and rhythm without murmur, click or rub. No peripheral edema and peripheral pulses strong  ABDOMEN: soft, epigastric, periumbilical, and right > left suprapubic tenderness, no hepatosplenomegaly, no masses and bowel sounds normal  MS: no gross musculoskeletal defects noted, no edema  SKIN: no suspicious lesions or rashes  NEURO: Normal strength and tone, sensory exam grossly normal, mentation intact, oriented times 3 and cranial nerves 2-12 intact  PSYCH: mentation appears normal, affect normal/bright     Diagnostic Test Results:  none      ASSESSMENT/PLAN:                                                      (R10.13) Epigastric pain  (primary encounter diagnosis)  (K21.9) Gastroesophageal reflux disease without esophagitis  Comment: I suspect she has acute gastritis and possible PUD on top of GERD which has developed within the last couple months.   Plan: H Pylori antigen, stool, CBC with platelets,         Hepatic panel, Amylase, omeprazole (PRILOSEC)         20 MG CR capsule        Handouts provided. DC Ibuprofen. She can take Tylenol for pain. Plan to take omeprazole BID for 2-3 months to heal a possible ulcer. After that, she will probably want to take it once daily for her GERD.     (Z23) Need for prophylactic vaccination and inoculation against influenza  Comment: Influenza vaccine offered and accepted by patient. She has received it before without problems.   Plan: HC FLU VAC PRESRV FREE QUAD SPLIT VIR 3+YRS IM         [[id number]],      ADMIN VACCINE, FIRST [[id number]]              The information in this document, created by the medical scribe for me, accurately reflects the services I personally performed and the decisions made by me. I have reviewed and approved this document  for accuracy prior to leaving the patient care area. January 25, 2018 5:14 PM   Scotty Forde MD

## 2018-01-25 NOTE — PROGRESS NOTES

## 2018-01-25 NOTE — PATIENT INSTRUCTIONS
At [facility], we strive to deliver an exceptional experience to you, every time we see you.  If you receive a survey in the mail, please send us back your thoughts. We really do value your feedback.    Based on your medical history, these are the current health maintenance/preventive care services that you are due for (some may have been done at this visit.)  Health Maintenance Due   Topic Date Due     INFLUENZA VACCINE (SYSTEM ASSIGNED)  09/01/2017         Suggested websites for health information:  Www.[employer].org : Up to date and easily searchable information on multiple topics.  Www.[employer].gov : medication info, interactive tutorials, watch real surgeries online  Www.familydoctor.org : good info from the Academy of Family Physicians  Www.cdc.gov : public health info, travel advisories, epidemics (H1N1)  Www.aap.org : children's health info, normal development, vaccinations  Www.health.[facility].[place].us : [place] dept of health, public health issues in [place], N1N1    Your care team:                            Family Medicine Internal Medicine   MD Chicho Baumann MD Shantel Branch-Fleming, MD Katya Georgiev PA-C Nam Ho, MD Pediatrics   Manuel Sellers PAANISH Mena, CNP Wendy Alvarado APRN CNP   MD Juanita Guzmán MD Deborah Mielke, MD Kim Thein, APRN [facility]      Clinic hours: Monday - Thursday 7 am-7 pm; Fridays 7 am-5 pm.   Urgent care: Monday - Friday 11 am-9 pm; Saturday and Sunday 9 am-5 pm.  Pharmacy : Monday -Thursday 8 am-8 pm; Friday 8 am-6 pm; Saturday and Sunday 9 am-5 pm.     Clinic: [phone]   Pharmacy: [phone]    Understanding H. pylori and Ulcers     An ulcer can form in two areas of the digestive tract; the stomach and the duodenum (where the stomach meets the small intestine).     Traditionally, ulcers, or sores in the lining of your digestive tract, were thought to be caused by too much spicy food, stress, or an anxious  personality. We now know that most ulcers are probably due to infection with bacteria known as Helicobacter pylori (H. pylori).  Common ulcer symptoms  These include the following:    Burning, cramping, or hungerlike pain in the stomach area, often 1 to 3 hours after a meal or in the middle of the night    Pain that gets better or worse with eating    Nausea or vomiting    Black, tarry, or bloody stools (which means the ulcer is bleeding)  Or you may have no symptoms.  Your evaluation  An evaluation by your healthcare provider can show if you have an ulcer and determine whether it was caused by H. pylori. Your healthcare provider may ask you questions, examine you, and possibly do some tests. These may include:    A special X-ray called an upper gastrointestinal series, to help locate an ulcer. Before the test, you drink a chalky liquid, called barium. This liquid helps the ulcer show up on the X-ray.    An endoscopic exam, done with a long tube with a camera on the end. The tube is passed through your mouth into your stomach, and allows the healthcare provider to get a closer look at your ulcer. You will be lightly sedated for this procedure. Your healthcare provider can also take a tissue sample to test for H. pylori.    Blood, stool, and breath tests are also available to show whether you have H. pylori in your digestive tract.  Your treatment  To kill H. pylori so your ulcer can heal, your healthcare provider will probably prescribe antibiotics. Other ulcer medicines that help reduce stomach acid may also be prescribed as well. Testing after treatment may be recommended to be sure the H. pylori infection is gone. Usually, killing H. pylori helps keep the ulcer from returning. However, you can develop ulcers more than once in your lifetime.   Date Last Reviewed: 7/1/2016 2000-2017 [employer]. [address] [place] [address], [place] [address]. All rights reserved. This information is not intended as a  substitute for professional medical care. Always follow your healthcare professional's instructions.        GERD (Adult)    The esophagus is a tube that carries food from the mouth to the stomach. A valve at the lower end of the esophagus prevents stomach acid from flowing upward. When this valve doesn't work properly, stomach contents may repeatedly flow back up (reflux) into the esophagus. This is called gastroesophageal reflux disease (GERD). GERD can irritate the esophagus. It can cause problems with swallowing or breathing. In severe cases, GERD can cause recurrent pneumonia or other serious problems.  Symptoms of reflux include burning, pressure or sharp pain in the upper abdomen or mid to lower chest. The pain can spread to the neck, back, or shoulder. There may be belching, an acid taste in the back of the throat, chronic cough, or sore throat or hoarseness. GERD symptoms often occur during the day after a big meal. They can also occur at night when lying down.   Home care  Lifestyle changes can help reduce symptoms. If needed, medicines may be prescribed. Symptoms often improve with treatment, but if treatment is stopped, the symptoms often return after a few months. So most persons with GERD will need to continue treatment.  Lifestyle changes    Limit or avoid fatty, fried, and spicy foods, as well as coffee, chocolate, mint, and foods with high acid content such as tomatoes and citrus fruit and juices (orange, grapefruit, lemon).    Don t eat large meals, especially at night. Frequent, smaller meals are best. Do not lie down right after eating. And don t eat anything 3 hours before going to bed.    Avoid drinking alcohol and smoking. As much as possible, stay away from second hand smoke.    If you are overweight, losing weight will reduce symptoms.     Avoid wearing tight clothing around your stomach area.    If your symptoms occur during sleep, use a foam wedge to elevate your upper body (not just your  "head.) Or, place 4\" blocks under the head of your bed.  Medicines  If needed, medicines can help relieve the symptoms of GERD and prevent damage to the esophagus. Discuss a medicine plan with your healthcare provider. This may include one or more of the following medicines:    Antacids to help neutralize the normal acids in your stomach.    Acid blockers (H2 blockers) to decrease acid production.    Acid inhibitors (PPIs) to decrease acid production in a different way than the blockers. They may work better, but can take a little longer to take effect.  Take an antacid 30-60 minutes after eating and at bedtime, but not at the same time as an acid blocker.  Try not to take medicines such as ibuprofen and aspirin. If you are taking aspirin for your heart or other medical reasons, talk to your healthcare provider about stopping it.  Follow-up care  Follow up with your healthcare provider or as advised by our staff.  When to seek medical advice  Call your healthcare provider if any of the following occur:    Stomach pain gets worse or moves to the lower right abdomen (appendix area)    Chest pain appears or gets worse, or spreads to the back, neck, shoulder, or arm    Frequent vomiting (can t keep down liquids)    Blood in the stool or vomit (red or black in color)    Feeling weak or dizzy    Fever of 100.4 F (38 C) or higher, or as directed by your healthcare provider  Date Last Reviewed: 6/23/2015 2000-2017 [employer]. [address] [place] [id number]. All rights reserved. This information is not intended as a substitute for professional medical care. Always follow your healthcare professional's instructions.        Lifestyle Changes for Controlling GERD  When you have GERD, stomach acid feels as if it s backing up toward your mouth. Whether or not you take medicine to control your GERD, your symptoms can often be improved with lifestyle changes. Talk to your healthcare provider about the " following suggestions. These suggestions may help you get relief from your symptoms.      Raise your head  Reflux is more likely to strike when you re lying down flat, because stomach fluid can flow backward more easily. Raising the head of your bed 4 to 6 inches can help. To do this:    Slide blocks or books under the legs at the head of your bed. Or, place a wedge under the mattress. Many foam stores can make a suitable wedge for you. The wedge should run from your waist to the top of your head.    Don t just prop your head on several pillows. This increases pressure on your stomach. It can make GERD worse.  Watch your eating habits  Certain foods may increase the acid in your stomach or relax the lower esophageal sphincter. This makes GERD more likely. It s best to avoid the following if they cause you symptoms:    Coffee, tea, and carbonated drinks (with and without caffeine)    Fatty, fried, or spicy food    Mint, chocolate, onions, and tomatoes    Peppermint    Any other foods that seem to irritate your stomach or cause you pain  Relieve the pressure  Tips include the following:    Eat smaller meals, even if you have to eat more often.    Don t lie down right after you eat. Wait a few hours for your stomach to empty.    Avoid tight belts and tight-fitting clothes.    Lose excess weight.  Tobacco and alcohol  Avoid smoking tobacco and drinking alcohol. They can make GERD symptoms worse.  Date Last Reviewed: 7/1/2016 2000-2017 [employer] [address] [facility], [place] [address]. All rights reserved. This information is not intended as a substitute for professional medical care. Always follow your healthcare professional's instructions.

## 2018-01-25 NOTE — MR AVS SNAPSHOT
After Visit Summary   1/25/2018    Steffany LOMAS    MRN: [id number]           Patient Information     Date Of Birth          1990        Visit Information        Provider Department      1/25/2018 6:20 PM Scotty Forde MD [facility]        Today's Diagnoses     Epigastric pain    -  1    Gastroesophageal reflux disease without esophagitis        Need for prophylactic vaccination and inoculation against influenza          Care Instructions    At [facility], we strive to deliver an exceptional experience to you, every time we see you.  If you receive a survey in the mail, please send us back your thoughts. We really do value your feedback.    Based on your medical history, these are the current health maintenance/preventive care services that you are due for (some may have been done at this visit.)  Health Maintenance Due   Topic Date Due     INFLUENZA VACCINE (SYSTEM ASSIGNED)  09/01/2017         Suggested websites for health information:  Www.Obvious : Up to date and easily searchable information on multiple topics.  Www.medlineplus.gov : medication info, interactive tutorials, watch real surgeries online  Www.familydoctor.org : good info from the Academy of Family Physicians  Www.cdc.gov : public health info, travel advisories, epidemics (H1N1)  Www.aap.org : children's health info, normal development, vaccinations  Www.health.state.[place].us : [place] dept of health, public health issues in [place], N1N1    Your care team:                            Family Medicine Internal Medicine   MD Chicho Baumann MD Shantel Branch-Fleming, MD Katya Georgiev PA-C Nam Ho, MD Pediatrics   CONRAD Jj, DEYSI Alvarado APRN MD Juanita Couch MD Deborah Mielke, MD Kim Thein, APRN CNP      Clinic hours: Monday - Thursday 7 am-7 pm; Fridays 7 am-5 pm.   Urgent care: Monday - Friday 11 am-9  pm; Saturday and Sunday 9 am-5 pm.  Pharmacy : Monday -Thursday 8 am-8 pm; Friday 8 am-6 pm; Saturday and Sunday 9 am-5 pm.     Clinic: [phone]   Pharmacy: [phone]    Understanding H. pylori and Ulcers     An ulcer can form in two areas of the digestive tract; the stomach and the duodenum (where the stomach meets the small intestine).     Traditionally, ulcers, or sores in the lining of your digestive tract, were thought to be caused by too much spicy food, stress, or an anxious personality. We now know that most ulcers are probably due to infection with bacteria known as Helicobacter pylori (H. pylori).  Common ulcer symptoms  These include the following:    Burning, cramping, or hungerlike pain in the stomach area, often 1 to 3 hours after a meal or in the middle of the night    Pain that gets better or worse with eating    Nausea or vomiting    Black, tarry, or bloody stools (which means the ulcer is bleeding)  Or you may have no symptoms.  Your evaluation  An evaluation by your healthcare provider can show if you have an ulcer and determine whether it was caused by H. pylori. Your healthcare provider may ask you questions, examine you, and possibly do some tests. These may include:    A special X-ray called an upper gastrointestinal series, to help locate an ulcer. Before the test, you drink a chalky liquid, called barium. This liquid helps the ulcer show up on the X-ray.    An endoscopic exam, done with a long tube with a camera on the end. The tube is passed through your mouth into your stomach, and allows the healthcare provider to get a closer look at your ulcer. You will be lightly sedated for this procedure. Your healthcare provider can also take a tissue sample to test for H. pylori.    Blood, stool, and breath tests are also available to show whether you have H. pylori in your digestive tract.  Your treatment  To kill H. pylori so your ulcer can heal, your healthcare provider will probably  prescribe antibiotics. Other ulcer medicines that help reduce stomach acid may also be prescribed as well. Testing after treatment may be recommended to be sure the H. pylori infection is gone. Usually, killing H. pylori helps keep the ulcer from returning. However, you can develop ulcers more than once in your lifetime.   Date Last Reviewed: 7/1/2016 2000-2017 [employer]. [address] [place] [address]. All rights reserved. This information is not intended as a substitute for professional medical care. Always follow your healthcare professional's instructions.        GERD (Adult)    The esophagus is a tube that carries food from the mouth to the stomach. A valve at the lower end of the esophagus prevents stomach acid from flowing upward. When this valve doesn't work properly, stomach contents may repeatedly flow back up (reflux) into the esophagus. This is called gastroesophageal reflux disease (GERD). GERD can irritate the esophagus. It can cause problems with swallowing or breathing. In severe cases, GERD can cause recurrent pneumonia or other serious problems.  Symptoms of reflux include burning, pressure or sharp pain in the upper abdomen or mid to lower chest. The pain can spread to the neck, back, or shoulder. There may be belching, an acid taste in the back of the throat, chronic cough, or sore throat or hoarseness. GERD symptoms often occur during the day after a big meal. They can also occur at night when lying down.   Home care  Lifestyle changes can help reduce symptoms. If needed, medicines may be prescribed. Symptoms often improve with treatment, but if treatment is stopped, the symptoms often return after a few months. So most persons with GERD will need to continue treatment.  Lifestyle changes    Limit or avoid fatty, fried, and spicy foods, as well as coffee, chocolate, mint, and foods with high acid content such as tomatoes and citrus fruit and juices (orange,  "grapefruit, lemon).    Don t eat large meals, especially at night. Frequent, smaller meals are best. Do not lie down right after eating. And don t eat anything 3 hours before going to bed.    Avoid drinking alcohol and smoking. As much as possible, stay away from second hand smoke.    If you are overweight, losing weight will reduce symptoms.     Avoid wearing tight clothing around your stomach area.    If your symptoms occur during sleep, use a foam wedge to elevate your upper body (not just your head.) Or, place 4\" blocks under the head of your bed.  Medicines  If needed, medicines can help relieve the symptoms of GERD and prevent damage to the esophagus. Discuss a medicine plan with your healthcare provider. This may include one or more of the following medicines:    Antacids to help neutralize the normal acids in your stomach.    Acid blockers (H2 blockers) to decrease acid production.    Acid inhibitors (PPIs) to decrease acid production in a different way than the blockers. They may work better, but can take a little longer to take effect.  Take an antacid 30-60 minutes after eating and at bedtime, but not at the same time as an acid blocker.  Try not to take medicines such as ibuprofen and aspirin. If you are taking aspirin for your heart or other medical reasons, talk to your healthcare provider about stopping it.  Follow-up care  Follow up with your healthcare provider or as advised by our staff.  When to seek medical advice  Call your healthcare provider if any of the following occur:    Stomach pain gets worse or moves to the lower right abdomen (appendix area)    Chest pain appears or gets worse, or spreads to the back, neck, shoulder, or arm    Frequent vomiting (can t keep down liquids)    Blood in the stool or vomit (red or black in color)    Feeling weak or dizzy    Fever of 100.4 F (38 C) or higher, or as directed by your healthcare provider  Date Last Reviewed: 6/23/2015 2000-2017 [employer] " [employer]. [address] [place] [address], [place] [address]. All rights reserved. This information is not intended as a substitute for professional medical care. Always follow your healthcare professional's instructions.        Lifestyle Changes for Controlling GERD  When you have GERD, stomach acid feels as if it s backing up toward your mouth. Whether or not you take medicine to control your GERD, your symptoms can often be improved with lifestyle changes. Talk to your healthcare provider about the following suggestions. These suggestions may help you get relief from your symptoms.      Raise your head  Reflux is more likely to strike when you re lying down flat, because stomach fluid can flow backward more easily. Raising the head of your bed 4 to 6 inches can help. To do this:    Slide blocks or books under the legs at the head of your bed. Or, place a wedge under the mattress. [employer] can make a suitable wedge for you. The wedge should run from your waist to the top of your head.    Don t just prop your head on several pillows. This increases pressure on your stomach. It can make GERD worse.  Watch your eating habits  Certain foods may increase the acid in your stomach or relax the lower esophageal sphincter. This makes GERD more likely. It s best to avoid the following if they cause you symptoms:    Coffee, tea, and carbonated drinks (with and without caffeine)    Fatty, fried, or spicy food    Mint, chocolate, onions, and tomatoes    Peppermint    Any other foods that seem to irritate your stomach or cause you pain  Relieve the pressure  Tips include the following:    Eat smaller meals, even if you have to eat more often.    Don t lie down right after you eat. Wait a few hours for your stomach to empty.    Avoid tight belts and tight-fitting clothes.    Lose excess weight.  Tobacco and alcohol  Avoid smoking tobacco and drinking alcohol. They can make GERD symptoms worse.  Date Last Reviewed: 7/1/2016     7875-3174 The "[employer]". [address] [place] [address], [place] [address]. All rights reserved. This information is not intended as a substitute for professional medical care. Always follow your healthcare professional's instructions.                Follow-ups after your visit        Follow-up notes from your care team     Return if symptoms worsen or fail to improve.      Your next 10 appointments already scheduled     Jan 25, 2018  6:20 PM CST   [facility]    [address] [place] [id number] [phone]           Bring a current list of meds and any records pertaining to this visit. For Physicals, please bring immunization records and any forms needing to be filled out. Please arrive 10 minutes early to complete paperwork.              Future tests that were ordered for you today     Open Future Orders        Priority Expected Expires Ordered    H Pylori antigen, stool Routine  2/24/2018 1/25/2018            Who to contact     If you have questions or need follow up information about today's clinic visit or your schedule please contact [facility] directly at [phone].  Normal or non-critical lab and imaging results will be communicated to you by MyChart, letter or phone within 4 business days after the clinic has received the results. If you do not hear from us within 7 days, please contact the clinic through MyChart or phone. If you have a critical or abnormal lab result, we will notify you by phone as soon as possible.  Submit refill requests through [employer] or call your pharmacy and they will forward the refill request to us. Please allow 3 business days for your refill to be completed.          Additional Information About Your Visit        MyChart Information     [employer] gives you secure access to your electronic health record. If you see a primary care provider,  "you can also send messages to your care team and make appointments. If you have questions, please call your primary care clinic.  If you do not have a primary care provider, please call [phone] and they will assist you.        Care EveryWhere ID     This is your Care EveryWhere ID. This could be used by other organizations to access your [facility] medical records  [id number]        Your Vitals Were     Pulse Temperature Height Pulse Oximetry BMI (Body Mass Index)       95 99.3  F (37.4  C) (Oral) 5' 2.5\" (1.588 m) 99% 30.78 kg/m2        Blood Pressure from Last 3 Encounters:   01/25/18 99/63   05/01/17 92/57   02/14/17 98/60    Weight from Last 3 Encounters:   01/25/18 171 lb (77.6 kg)   05/30/17 160 lb (72.6 kg)   02/14/17 166 lb 8 oz (75.5 kg)              We Performed the Following          ADMIN VACCINE, FIRST [56584]     Amylase     CBC with platelets     HC FLU VAC PRESRV FREE QUAD SPLIT VIR 3+YRS IM  [86784]     Hepatic panel          Today's Medication Changes          These changes are accurate as of 1/25/18  5:34 PM.  If you have any questions, ask your nurse or doctor.               Start taking these medicines.        Dose/Directions    omeprazole 20 MG CR capsule   Commonly known as:  priLOSEC   Used for:  Epigastric pain, Gastroesophageal reflux disease without esophagitis   Started by:  Scotty Forde MD        Dose:  20 mg   Take 1 capsule (20 mg) by mouth 2 times daily (before meals) for reflux/stomach.   Quantity:  180 capsule   Refills:  0            Where to get your medicines      [place] [facility] [place] [address]  [address], [facility] [address]     Phone:  [phone]     omeprazole 20 MG CR capsule                Primary Care Provider Office Phone # Fax #    Hallieivan Abebe [phone]       [address]  [facility] [id number]        Equal Access to Services     " DREAD PEREZ : Hadii aad farshad kami Ambrosio, waaxda luqadaha, qaybta kaalmada parrish, patricia jason hayjesus isaacsmelodiealyson arce . So [facility] [phone].    ATENCIÓN: Si habla español, tiene a murillo disposición servicios gratuitos de asistencia lingüística. Llame al [phone].    We comply with applicable federal civil rights laws and [place] laws. We do not discriminate on the basis of race, color, national origin, age, disability, sex, sexual orientation, or gender identity.            Thank you!     Thank you for choosing [facility]  for your care. Our goal is always to provide you with excellent care. Hearing back from our patients is one way we can continue to improve our services. Please take a few minutes to complete the written survey that you may receive in the mail after your visit with us. Thank you!             Your Updated Medication List - Protect others around you: Learn how to safely use, store and throw away your medicines at www.disposemymeds.org.          This list is accurate as of 1/25/18  5:34 PM.  Always use your most recent med list.                   Brand Name Dispense Instructions for use Diagnosis    ibuprofen 800 MG tablet    ADVIL/MOTRIN    15 tablet    Take 0.5 tablet every 4 hour as needed for pain    Strep throat       omeprazole 20 MG CR capsule    priLOSEC    180 capsule    Take 1 capsule (20 mg) by mouth 2 times daily (before meals) for reflux/stomach.    Epigastric pain, Gastroesophageal reflux disease without esophagitis       TYLENOL PO

## 2018-01-26 LAB
ALBUMIN SERPL-MCNC: 4 G/DL (ref 3.4–5)
ALP SERPL-CCNC: 70 U/L (ref 40–150)
ALT SERPL W P-5'-P-CCNC: 36 U/L (ref 0–50)
AMYLASE SERPL-CCNC: 64 U/L (ref 30–110)
AST SERPL W P-5'-P-CCNC: 26 U/L (ref 0–45)
BILIRUB DIRECT SERPL-MCNC: 0.2 MG/DL (ref 0–0.2)
BILIRUB SERPL-MCNC: 0.7 MG/DL (ref 0.2–1.3)
PROT SERPL-MCNC: 8.1 G/DL (ref 6.8–8.8)

## 2018-01-31 DIAGNOSIS — K21.9 GASTROESOPHAGEAL REFLUX DISEASE WITHOUT ESOPHAGITIS: ICD-10-CM

## 2018-01-31 DIAGNOSIS — R10.13 EPIGASTRIC PAIN: ICD-10-CM

## 2018-01-31 PROCEDURE — 87338 HPYLORI STOOL AG IA: CPT | Performed by: FAMILY MEDICINE

## 2018-02-01 LAB
H PYLORI AG STL QL IA: NORMAL
SPECIMEN SOURCE: NORMAL

## 2019-02-13 NOTE — NURSING NOTE
"Chief Complaint   Patient presents with     Throat Pain     began last night; worse today     Generalized Body Aches     began last night     Headache     on and off     Chills       Initial BP 92/57 (BP Location: Left arm)  Pulse 96  Temp 99.9  F (37.7  C) (Oral) Estimated body mass index is 29.97 kg/(m^2) as calculated from the following:    Height as of 2/14/17: 5' 2.5\" (1.588 m).    Weight as of 2/14/17: 166 lb 8 oz (75.5 kg).  Medication Reconciliation: complete  " independent

## 2020-02-24 ENCOUNTER — HEALTH MAINTENANCE LETTER (OUTPATIENT)
Age: 30
End: 2020-02-24

## 2020-12-13 ENCOUNTER — HEALTH MAINTENANCE LETTER (OUTPATIENT)
Age: 30
End: 2020-12-13

## 2021-04-17 ENCOUNTER — HEALTH MAINTENANCE LETTER (OUTPATIENT)
Age: 31
End: 2021-04-17

## 2021-09-26 ENCOUNTER — HEALTH MAINTENANCE LETTER (OUTPATIENT)
Age: 31
End: 2021-09-26

## 2022-05-08 ENCOUNTER — HEALTH MAINTENANCE LETTER (OUTPATIENT)
Age: 32
End: 2022-05-08

## 2023-04-23 ENCOUNTER — HEALTH MAINTENANCE LETTER (OUTPATIENT)
Age: 33
End: 2023-04-23

## 2023-06-02 ENCOUNTER — HEALTH MAINTENANCE LETTER (OUTPATIENT)
Age: 33
End: 2023-06-02